# Patient Record
Sex: MALE | Race: WHITE | Employment: FULL TIME | URBAN - METROPOLITAN AREA
[De-identification: names, ages, dates, MRNs, and addresses within clinical notes are randomized per-mention and may not be internally consistent; named-entity substitution may affect disease eponyms.]

---

## 2023-06-06 ENCOUNTER — APPOINTMENT (OUTPATIENT)
Dept: GENERAL RADIOLOGY | Age: 48
End: 2023-06-06
Payer: COMMERCIAL

## 2023-06-06 ENCOUNTER — HOSPITAL ENCOUNTER (EMERGENCY)
Age: 48
Discharge: HOME | End: 2023-06-06
Attending: EMERGENCY MEDICINE
Payer: COMMERCIAL

## 2023-06-06 VITALS
HEART RATE: 62 BPM | OXYGEN SATURATION: 100 % | DIASTOLIC BLOOD PRESSURE: 68 MMHG | WEIGHT: 244.6 LBS | TEMPERATURE: 98.2 F | HEIGHT: 77 IN | RESPIRATION RATE: 14 BRPM | BODY MASS INDEX: 28.88 KG/M2 | SYSTOLIC BLOOD PRESSURE: 121 MMHG

## 2023-06-06 DIAGNOSIS — R07.9 CHEST PAIN, UNSPECIFIED TYPE: Primary | ICD-10-CM

## 2023-06-06 LAB
ANION GAP SERPL CALCULATED.3IONS-SCNC: 13 MMOL/L (ref 3–16)
BASOPHILS # BLD: 0.1 K/UL (ref 0–0.2)
BASOPHILS NFR BLD: 0.8 %
BUN SERPL-MCNC: 13 MG/DL (ref 7–20)
CALCIUM SERPL-MCNC: 8.9 MG/DL (ref 8.3–10.6)
CHLORIDE SERPL-SCNC: 103 MMOL/L (ref 99–110)
CO2 SERPL-SCNC: 24 MMOL/L (ref 21–32)
CREAT SERPL-MCNC: 0.8 MG/DL (ref 0.9–1.3)
DEPRECATED RDW RBC AUTO: 13 % (ref 12.4–15.4)
EKG ATRIAL RATE: 57 BPM
EKG DIAGNOSIS: NORMAL
EKG P AXIS: 57 DEGREES
EKG P-R INTERVAL: 164 MS
EKG Q-T INTERVAL: 440 MS
EKG QRS DURATION: 114 MS
EKG QTC CALCULATION (BAZETT): 428 MS
EKG R AXIS: 76 DEGREES
EKG T AXIS: 40 DEGREES
EKG VENTRICULAR RATE: 57 BPM
EOSINOPHIL # BLD: 0.3 K/UL (ref 0–0.6)
EOSINOPHIL NFR BLD: 4.1 %
GFR SERPLBLD CREATININE-BSD FMLA CKD-EPI: >60 ML/MIN/{1.73_M2}
GLUCOSE SERPL-MCNC: 89 MG/DL (ref 70–99)
HCT VFR BLD AUTO: 42.5 % (ref 40.5–52.5)
HGB BLD-MCNC: 14.4 G/DL (ref 13.5–17.5)
LYMPHOCYTES # BLD: 1.5 K/UL (ref 1–5.1)
LYMPHOCYTES NFR BLD: 21.7 %
MCH RBC QN AUTO: 32.8 PG (ref 26–34)
MCHC RBC AUTO-ENTMCNC: 33.8 G/DL (ref 31–36)
MCV RBC AUTO: 96.9 FL (ref 80–100)
MONOCYTES # BLD: 0.8 K/UL (ref 0–1.3)
MONOCYTES NFR BLD: 11.9 %
NEUTROPHILS # BLD: 4.1 K/UL (ref 1.7–7.7)
NEUTROPHILS NFR BLD: 61.5 %
PLATELET # BLD AUTO: 212 K/UL (ref 135–450)
PMV BLD AUTO: 8.9 FL (ref 5–10.5)
POTASSIUM SERPL-SCNC: 4.2 MMOL/L (ref 3.5–5.1)
RBC # BLD AUTO: 4.38 M/UL (ref 4.2–5.9)
SODIUM SERPL-SCNC: 140 MMOL/L (ref 136–145)
TROPONIN, HIGH SENSITIVITY: 7 NG/L (ref 0–22)
TROPONIN, HIGH SENSITIVITY: <6 NG/L (ref 0–22)
WBC # BLD AUTO: 6.7 K/UL (ref 4–11)

## 2023-06-06 PROCEDURE — 93005 ELECTROCARDIOGRAM TRACING: CPT | Performed by: EMERGENCY MEDICINE

## 2023-06-06 PROCEDURE — 84484 ASSAY OF TROPONIN QUANT: CPT

## 2023-06-06 PROCEDURE — 80048 BASIC METABOLIC PNL TOTAL CA: CPT

## 2023-06-06 PROCEDURE — 71046 X-RAY EXAM CHEST 2 VIEWS: CPT

## 2023-06-06 PROCEDURE — 85025 COMPLETE CBC W/AUTO DIFF WBC: CPT

## 2023-06-06 PROCEDURE — 36415 COLL VENOUS BLD VENIPUNCTURE: CPT

## 2023-06-06 RX ORDER — OMEPRAZOLE 20 MG/1
20 CAPSULE, DELAYED RELEASE ORAL
Qty: 30 CAPSULE | Refills: 0 | Status: SHIPPED | OUTPATIENT
Start: 2023-06-06

## 2023-06-06 ASSESSMENT — HEART SCORE: ECG: 0

## 2023-06-06 ASSESSMENT — LIFESTYLE VARIABLES
HOW OFTEN DO YOU HAVE A DRINK CONTAINING ALCOHOL: 2-3 TIMES A WEEK
HOW MANY STANDARD DRINKS CONTAINING ALCOHOL DO YOU HAVE ON A TYPICAL DAY: 3 OR 4

## 2023-06-06 ASSESSMENT — PAIN SCALES - GENERAL: PAINLEVEL_OUTOF10: 0

## 2023-06-06 NOTE — ED PROVIDER NOTES
[address] [facility]          ATTENDING PHYSICIAN NOTE       Date of evaluation: 6/6/2023    Chief Complaint     Chest Pain (Patient started having chest pain for about 30 minutes starting at 1000; has had rib pain for a few weeks )      History of Present Illness     Ruth Yadav is a 52 y.o. male herniated vertebral disc presenting to the emergency department today for chest pain. Patient states that he was standing and working at his desk around 10 AM today when he developed a burning pressure in his central and left chest that radiated through to the back. It lasted approximately 15 to 20 minutes and resolved spontaneously. He is without symptoms currently. He denies lightheadedness, diaphoresis, nausea, or vomiting during the episode. Notes he has had somewhat similar episodes with burning in his central chest particularly at night. He is recently completed a 10-day course of prednisone for his vertebral discs, does not take any PPI or other acid reducing medications. Denies recent fever or cough. No history of DVT/PE, no recent travel or immobilization, no leg swelling or pain. Does state he has had to stand more recently during the day due to the pain in his back and difficulty sitting. Physical Exam     INITIAL VITALS: BP: 115/68, Temp: 98.2 °F (36.8 °C), Pulse: 59, Respirations: 14, SpO2: 99 %     Physical Exam    Nursing note and vitals reviewed. General:  Adult male, alert and appropriately interactive. In no distress. HENT: Normocephalic and atraumatic. External ears normal. Nose appears normal externally. Eyes: Conjunctivae normal. No scleral icterus. Neck: Neck supple. No tracheal deviation present. CV: Normal rate. Regular rhythm. S1/S2 auscultated. No murmurs, gallops or rubs. No reproducible chest wall tenderness. Pulm: Effort normal on room air. Breath sounds clear to auscultation bilaterally. No wheezes. No rales or rhonchi. GI: Soft.

## 2023-06-06 NOTE — DISCHARGE INSTRUCTIONS
You were seen in the emergency department for chest pain. Your exam, EKG, labs, and chest x-ray were all reassuring. While we do not know the exact cause of your pain today, there are no findings that suggest any emergent or concerning cause. Your symptoms may be due to worsened acid reflux in the setting of recently taking prednisone. We have prescribed an acid reducing medication, omeprazole, to see if this helps improve your symptoms. It may take 3 to 7 days to fully take effect. Call 911 or go to the nearest Emergency Department immediately for worsening symptoms, difficulty breathing, chest pain, lightheadedness, difficulty moving or talking, sensory loss, difficulty controlling your bowel or bladder function, altered level of consciousness, neck stiffness, uncontrollable nausea or vomiting, uncontrollable pain, inability to tolerate oral intake, fever, chills, severe bleeding, signs of infection (spreading redness, area feels very warm, swelling, pain, foul-smelling drainage), suicidal or homicidal ideation, or any other concerns. If we have prescribed you any new medications, please take them as prescribed and read the drug package insert carefully. Do not drink alcohol, drive, or operate machinery if you are taking narcotic pain medications. Your condition requires follow-up with your primary care provider, Gaviota Rose MD, within 7 days, please see above for details. Bring these discharge instructions with you when you see your doctor. Avoid all strenuous activity until you have checked with your primary care provider.

## 2024-08-19 ENCOUNTER — PROCEDURE VISIT (OUTPATIENT)
Dept: SURGERY | Age: 49
End: 2024-08-19
Payer: COMMERCIAL

## 2024-08-19 VITALS — HEART RATE: 56 BPM | DIASTOLIC BLOOD PRESSURE: 61 MMHG | SYSTOLIC BLOOD PRESSURE: 108 MMHG

## 2024-08-19 DIAGNOSIS — C44.311 BASAL CELL CARCINOMA OF NOSE: Primary | ICD-10-CM

## 2024-08-19 PROCEDURE — 17312 MOHS ADDL STAGE: CPT | Performed by: DERMATOLOGY

## 2024-08-19 PROCEDURE — 17311 MOHS 1 STAGE H/N/HF/G: CPT | Performed by: DERMATOLOGY

## 2024-08-19 PROCEDURE — 14060 TIS TRNFR E/N/E/L 10 SQ CM/<: CPT | Performed by: DERMATOLOGY

## 2024-08-19 NOTE — PATIENT INSTRUCTIONS
[facility] Mohs Surgery Office Hours:    Monday-Thursday  7:30 AM-4:30 PM    Friday  9:00 AM-1:00 PM       POST-OPERATIVE CARE FOR STITCHES  Bandage change after 48 hours    CARING FOR YOUR SURGICAL SITE  The bandage should remain on and completely dry for 48 hours. Do NOT get the bandage wet.  After the first 48 hours, gently remove the remaining part of the bandage. It can be helpful to moisten the bandage edges in the shower. Steri strips may still be on the wound. It is ok, they will fall off slowly with the daily bandage changes.  Gently clean the wound daily with mild soap and water. Try to clean off crust and debris.   Dry (pat) the area with a clean Q-tip or gauze.   Apply a layer of Vaseline/ Aquaphor (or Bacitracin if your doctor recommends) to the wound area only.  Cut a piece of Telfa (or any non-stick dressing) to fit just over the wound and secure it with paper tape. If the wound is small you may use a Band- Aid. Keep area covered for a total of 1 week(s).  If the dressing comes off or if you have questions, or concerns about the dressing, please call the office for instructions!    POST OPERATIVE INSTRUCTIONS    Activity: Do not lift anything heavier than a gallon of milk for 1 week. Also, avoid strenuous activity such as running, power walking or contact sports.  Eating and drinking: Do not drink alcohol for 48 hours after your procedure. Alcohol increases the chances of bleeding.  Medicines   -If you have discomfort, take Acetaminophen (Tylenol or Extra Strength Tylenol). Follow the instructions and warning on the bottle.  -If your doctor has prescribed you an Aspirin daily, please keep taking it. Do not take extra Aspirin or medicines containing Aspirin (such as [place] and Excedrin) for 48 hours after your procedure.    Bleeding: If bleeding occurs, DO NOT remove the bandage. Put firm pressure on the area with gauze for 20 minutes without peeking. If the bleeding continues, apply

## 2024-08-19 NOTE — PROGRESS NOTES
MOHS PROCEDURE NOTE    PHYSICIAN:  Ellyn Feliz MD, Who operated in two distinct and integrated capacities as the surgeon removing the tissue and as the pathologist examining the tissue.    ASSISTANT: Cheryl Sheehan RN, Christian Ugalde RN     REFERRING PROVIDER:  Moira Feliz D.O     PREOPERATIVE DIAGNOSIS: Infiltrative Basal Cell Carcinoma     SPECIFIC MOHS INDICATIONS:  location, aggressive histologic growth pattern, clinically ill-defined borders, and need for tissue conservation    AUC SCORIN/9    POSTOPERATIVE DIAGNOSIS: SAME    LOCATION: Left nasal ala    OPERATIVE PROCEDURE:  MOHS MICROGRAPHIC SURGERY    RECONSTRUCTION OF DEFECT: V to Y Advancement Flap (non-tunneled island pedicle flap)    PREOPERATIVE SIZE: 9x7 MM    DEFECT SIZE: 16x12 MM    LENGTH OF REPAIRED WOUND/SIZE OF FLAP/SIZE OF GRAFT:  7.08cm2    ANESTHESIA:  18mL 1% lidocaine with epinephrine 1:100,000 buffered.     EBL:  MINIMAL    DURATION OF PROCEDURE:  5 HOURS    POSTOPERATIVE OBSERVATION: 1 HOUR    SPECIMENS:  SEE MOHS MAP    COMPLICATIONS:  NONE    DESCRIPTION OF PROCEDURE:  The patient was given a mirror, as appropriate, and the biopsy site was identified, marked with a surgical marking pen, and verified by the patient.   Options for treatment were discussed and the patient was informed that Mohs surgery was the selected treatment based on its lower recurrence rate, given the features listed above, as compared to other treatment modalities such as excision, radiation, or curettage, and agreed with this treatment plan.  Risks and benefits including bruising, swelling, bleeding, infection, nerve injury, recurrence, and scarring were discussed with the patient prior to the procedure and a written consent detailing these and other risks was reviewed with the patient and signed.    There was a time out for person and procedure verification.  The surgical site was prepped with an antiseptic solution.  Application of an

## 2024-08-20 ENCOUNTER — TELEPHONE (OUTPATIENT)
Dept: SURGERY | Age: 49
End: 2024-08-20

## 2024-08-20 NOTE — TELEPHONE ENCOUNTER
The patient was in the office 8/19/2024 for mohs located on the left nasal ala with v to y advancement flap repair.  The patient tolerated the procedure well and left the office in good condition.    A post-operative telephone call was placed at 11:06am on 8/20/2024 in order to check on the patient's recovery process.  The patient was not able to be reached and a phone message was left.

## 2024-08-21 ENCOUNTER — TELEPHONE (OUTPATIENT)
Dept: SURGERY | Age: 49
End: 2024-08-21

## 2024-08-21 NOTE — TELEPHONE ENCOUNTER
Patient called office with several questions regarding aftercare. Patient states he removed bandage as instructed today and noticed that there is dried blood and some bandage remaining that he was not clear what to do. Informed patient that he can gently cleanse over entire incision with gentle cleanser and water. Explained that if he can he should try to remove any dried blood or debris but not to scrub or rub vigorously or to pull anything. He then asked about the swelling he was having and explained that swelling is typically worst the first 72 hours after procedure then begins to taper off and diminish. Informed patient that working on the side of the nose and the amount of sutures he needed that swelling in the cheek/eye is normal at this point in the healing process and that he will most likely still have some swelling even at his post op visit for suture removal next week. Patient then asked about a raised puffy area on the superior end of the incision that he noticed and asked if this was ok and explained that this was due to swelling and scar tissue and that the skin on this part is thinner and other areas. Patient verbalized understanding and no further questions.

## 2024-08-26 ENCOUNTER — OFFICE VISIT (OUTPATIENT)
Dept: SURGERY | Age: 49
End: 2024-08-26

## 2024-08-26 DIAGNOSIS — Z48.02 VISIT FOR SUTURE REMOVAL: Primary | ICD-10-CM

## 2024-08-26 PROCEDURE — 99024 POSTOP FOLLOW-UP VISIT: CPT | Performed by: DERMATOLOGY

## 2024-08-26 NOTE — PROGRESS NOTES
S:  The patient is here for suture removal s/p Mohs surgery on the left nasal ala and V to Y Advancement Flap (non-tunneled island pedicle flap) repair, 1 week(s) ago. The site appears well-healed without signs of infection (redness, pain or discharge). The sutures were removed. Looks good. Still edematous in nasal valve but wnl for 1 week post op.  Wound care and activity instructions given.  The patient was scheduled for follow-up in 3-4 weeks for scar/wound check.  The patient was scheduled for f/u with General Dermatology per their instructions.     Christian BUSTOS RN, am scribing for and in the presence of Dr. Ellyn Feliz.     Electronically signed by Christian Ugalde RN on 8/26/2024 at 3:54 PM     Ellyn BUSTOS MD,  personally performed the services described in this documentation as scribed by Christian Ugalde RN  in my presence, and it is both accurate and complete.     Electronically signed by Ellyn Feliz MD on 8/27/2024 at 8:38 AM

## 2024-09-16 ENCOUNTER — OFFICE VISIT (OUTPATIENT)
Dept: SURGERY | Age: 49
End: 2024-09-16

## 2024-09-16 DIAGNOSIS — L90.5 SCAR: Primary | ICD-10-CM

## 2024-09-16 PROCEDURE — 99024 POSTOP FOLLOW-UP VISIT: CPT | Performed by: DERMATOLOGY

## 2024-11-18 ENCOUNTER — OFFICE VISIT (OUTPATIENT)
Dept: SURGERY | Age: 49
End: 2024-11-18

## 2024-11-18 DIAGNOSIS — L90.5 SCAR: Primary | ICD-10-CM

## 2024-11-18 PROCEDURE — 99024 POSTOP FOLLOW-UP VISIT: CPT | Performed by: DERMATOLOGY

## 2024-11-18 NOTE — PROGRESS NOTES
S: The patient is here for scar check s/p Mohs on the left nasal ala with V to Y Advancement Flap (non-tunneled island pedicle flap) repair, 3 months ago.  The patient c/o nasal valve fullness/difficulty breathing.  O: The scar is well-approximated and shows no signs of abnormal healing (expected amount of erythema, fullness and coloration), the external part of the scar is looking very good however there is diminishment of the size of the nasal valve.  Upon palpation of the scar, it is thick and contributing to the valve narrowness and breathing issues.  A/P:nasal valve narrowing: d/w pt scar massage.  D/w pt ilk.  He declined ilk at this time.  Will schedule at his discretion.  If valve continues to be an issue, pt may be candidate for Latera  nasal valve implant?  Patient questions answered and expectations reviewed.  The patient is scheduled for f/u scar check in 4-8 weeks for ILK  and skin examination with General Dermatology per their recommendations.

## 2024-11-18 NOTE — PATIENT INSTRUCTIONS
[facility] Surgery Office Hours:    Monday-Thursday  7:30 AM-4:30 PM    Friday  9:00 AM-1:00 PM      Wound Care Massaging Instruction    Starting at approximately about 4 weeks following surgery, we often ask that our patients begin massaging their wound on a regular basis.  We suggest just a few minutes once a day. It is helpful to use an emollient such as Aquaphor, Vaseline or Eucerin cream.  The sutures that were placed underneath the surface of the skin take about 70 days to dissolve, and during that time, they can cause lumps along the line of the scar.  These lumps will eventually go away on their own, but the use of regular massage will help speed the process as well as help soften the scar tissue more quickly.  Please continue to use sunscreen, SPF 45 or higher during this time.

## 2024-12-19 ENCOUNTER — OFFICE VISIT (OUTPATIENT)
Dept: SURGERY | Age: 49
End: 2024-12-19
Payer: COMMERCIAL

## 2024-12-19 DIAGNOSIS — L90.5 SCAR: Primary | ICD-10-CM

## 2024-12-19 PROCEDURE — 11900 INJECT SKIN LESIONS </W 7: CPT | Performed by: DERMATOLOGY

## 2024-12-19 RX ORDER — TRIAMCINOLONE ACETONIDE 40 MG/ML
40 INJECTION, SUSPENSION INTRA-ARTICULAR; INTRAMUSCULAR ONCE
Status: COMPLETED | OUTPATIENT
Start: 2024-12-19 | End: 2024-12-19

## 2024-12-19 RX ADMIN — TRIAMCINOLONE ACETONIDE 40 MG: 40 INJECTION, SUSPENSION INTRA-ARTICULAR; INTRAMUSCULAR at 15:04

## 2024-12-19 NOTE — PROGRESS NOTES
S: The patient is here for scar check s/p mohs on the nose with V to Y Advancement Flap (non-tunneled island pedicle flap) repair, 4 months ago.  The patient c/o persistent nasal valve collapse with inspiration although this has improved sig in the last month.  O: The scar is well-approximated and shows no signs of abnormal healing (expected amount of erythema, fullness and coloration), fullness of scar on the anterior nasal valve.  A/P: D/w patient intralesional Kenalog injection to help flatten and soften the scar. The patient opted to proceed with injection.  1cc of 40mg/cc ILK injected into scar.  Instructed to massage.     Patient questions answered and expectations reviewed.  The patient is scheduled for f/u scar check in approx 2 months and skin examination with General Dermatology per their recommendations.

## 2024-12-19 NOTE — PATIENT INSTRUCTIONS
[facility] Mohs Surgery Office Hours:    Monday-Thursday  7:30 AM-4:30 PM    Friday  9:00 AM-1:00 PM    See how scar feels over next 1-2 months, if not improved call office for appointment.      Wound Care Massaging Instruction    Starting at approximately about 4 weeks following surgery, we often ask that our patients begin massaging their wound on a regular basis.  We suggest just a few minutes once a day. It is helpful to use an emollient such as Aquaphor, Vaseline or Eucerin cream.  The sutures that were placed underneath the surface of the skin take about 70 days to dissolve, and during that time, they can cause lumps along the line of the scar.  These lumps will eventually go away on their own, but the use of regular massage will help speed the process as well as help soften the scar tissue more quickly.  Please continue to use sunscreen, SPF 45 or higher during this time.

## 2025-01-31 ENCOUNTER — COMMUNITY OUTREACH (OUTPATIENT)
Dept: FAMILY MEDICINE CLINIC | Age: 50
End: 2025-01-31

## 2025-01-31 NOTE — PROGRESS NOTES
Patient's HM shows they are overdue for Colorectal Screening.   Care Everywhere and  files searched.  No results to attach to order nor HM updated.      Faxed request to  endoscopy center at [phone].